# Patient Record
(demographics unavailable — no encounter records)

---

## 2024-10-22 NOTE — END OF VISIT
[FreeTextEntry3] : All medical record entries made by the Scribe were at my Dr. Milo Henderson, direction and personally dictated by me on -Oct 22, 2024, 4:20PM- I have reviewed the chart and agree that the record accurately reflects my personal performance of the history, physical exam, assessment and plan. I have also personally directed, reviewed and agreed with the chart. [Time Spent: ___ minutes] : I have spent [unfilled] minutes of time on the encounter which excludes teaching and separately reported services.

## 2024-10-22 NOTE — PHYSICAL EXAM
[Well Developed] : well developed [Well Nourished] : well nourished [No Acute Distress] : no acute distress [Normal Conjunctiva] : normal conjunctiva [Normal Venous Pressure] : normal venous pressure [Carotid Bruit] : carotid bruit [Normal S1, S2] : normal S1, S2 [No Rub] : no rub [No Gallop] : no gallop [Murmur] : murmur [Normal Rate] : normal [Irregularly Irregular] : irregularly irregular [Clear Lung Fields] : clear lung fields [Good Air Entry] : good air entry [No Respiratory Distress] : no respiratory distress  [Soft] : abdomen soft [Non Tender] : non-tender [No Masses/organomegaly] : no masses/organomegaly [Normal Bowel Sounds] : normal bowel sounds [Normal Gait] : normal gait [No Edema] : no edema [No Cyanosis] : no cyanosis [No Clubbing] : no clubbing [No Varicosities] : no varicosities [No Rash] : no rash [No Skin Lesions] : no skin lesions [Moves all extremities] : moves all extremities [No Focal Deficits] : no focal deficits [Normal Speech] : normal speech [Alert and Oriented] : alert and oriented [Normal memory] : normal memory [de-identified] : R [de-identified] :  2/6 LOLLY --> Axilla

## 2024-10-22 NOTE — PHYSICAL EXAM
[Well Developed] : well developed [Well Nourished] : well nourished [No Acute Distress] : no acute distress [Normal Conjunctiva] : normal conjunctiva [Normal Venous Pressure] : normal venous pressure [Carotid Bruit] : carotid bruit [Normal S1, S2] : normal S1, S2 [No Rub] : no rub [No Gallop] : no gallop [Murmur] : murmur [Normal Rate] : normal [Irregularly Irregular] : irregularly irregular [Clear Lung Fields] : clear lung fields [Good Air Entry] : good air entry [No Respiratory Distress] : no respiratory distress  [Soft] : abdomen soft [Non Tender] : non-tender [No Masses/organomegaly] : no masses/organomegaly [Normal Bowel Sounds] : normal bowel sounds [Normal Gait] : normal gait [No Edema] : no edema [No Cyanosis] : no cyanosis [No Clubbing] : no clubbing [No Varicosities] : no varicosities [No Rash] : no rash [No Skin Lesions] : no skin lesions [Moves all extremities] : moves all extremities [No Focal Deficits] : no focal deficits [Normal Speech] : normal speech [Alert and Oriented] : alert and oriented [Normal memory] : normal memory [de-identified] : R [de-identified] :  2/6 LOLLY --> Axilla

## 2024-10-22 NOTE — HISTORY OF PRESENT ILLNESS
[FreeTextEntry1] : Denies Chest Pain, SOB, Dizziness, Leg edema, Orthopnea, PND, Palpitations, Syncope, SCHAFER, Diaphoresis.

## 2024-10-22 NOTE — DISCUSSION/SUMMARY
[Atrial Fibrillation] : atrial fibrillation [Controlled Ventricular Response] : controlled ventricular response [Hyperlipidemia] : hyperlipidemia [Diet Modification] : diet modification [Essential Hypertension] : essential hypertension [Low Sodium Diet] : low sodium diet [NSAIDs Avoidance] : non-steroidal anti-inflammatory drugs avoidance [Mitral Regurgitation] : mitral regurgitation [Stable] : stable [None] : There are no changes in medication management [Sodium Restriction] : sodium restriction [Patient] : the patient [de-identified] : Chronic. [FreeTextEntry1] : VHD (MR) - Stable; no further intervention at this time (see last echo). Blood work reviewed with patient. Maintain a low caloric, low sodium, low cholesterol diet. Dietary counseling given, diet and exercise discussed in detail.

## 2024-10-22 NOTE — DISCUSSION/SUMMARY
[Atrial Fibrillation] : atrial fibrillation [Controlled Ventricular Response] : controlled ventricular response [Hyperlipidemia] : hyperlipidemia [Diet Modification] : diet modification [Essential Hypertension] : essential hypertension [Low Sodium Diet] : low sodium diet [NSAIDs Avoidance] : non-steroidal anti-inflammatory drugs avoidance [Mitral Regurgitation] : mitral regurgitation [Stable] : stable [None] : There are no changes in medication management [Sodium Restriction] : sodium restriction [Patient] : the patient [de-identified] : Chronic. [FreeTextEntry1] : VHD (MR) - Stable; no further intervention at this time (see last echo). Blood work reviewed with patient. Maintain a low caloric, low sodium, low cholesterol diet. Dietary counseling given, diet and exercise discussed in detail.

## 2025-01-15 NOTE — END OF VISIT
[Time Spent: ___ minutes] : I have spent [unfilled] minutes of time on the encounter which excludes teaching and separately reported services. [FreeTextEntry3] :  All medical records entered made by the scribe were at my Dr. Milo Henderson, discretion and personally dictated by me on Raheel 15 2025 10:00AM. I have reviewed the chart and agree that the record accuracy reflects my personal performance of the history, physical exam, assessment and plan. I have also personally directed, reviewed and agreed with the chart.

## 2025-01-15 NOTE — DISCUSSION/SUMMARY
[Procedure Low Risk] : the procedure risk is low [Patient Intermediate Risk] : the patient is an intermediate risk [Optimized for Surgery] : the patient is optimized for surgery [As per surgery] : as per surgery [Continue] : Continue medications as currently directed [FreeTextEntry1] :  Patient advised to discontinue Xarelto 2 days before the procedure and on the day of as per guidelines published on [employer]. Resumption of Xarelto is at discretion of the surgeon. Maintain a low caloric diet, low sodium, low cholesterol diet and exercise discussed in detail.

## 2025-01-15 NOTE — HISTORY OF PRESENT ILLNESS
[Preoperative Visit] : for a medical evaluation prior to surgery [Scheduled Procedure ___] : a [unfilled] [Date of Surgery ___] : on [unfilled] [Surgeon Name ___] : surgeon: [unfilled] [Good] : Good [Prior Anesthesia] : Prior anesthesia [Fever] : no fever [Chills] : no chills [Fatigue] : no fatigue [Chest Pain] : no chest pain [Cough] : no cough [Dyspnea] : no dyspnea [Dysuria] : no dysuria [Urinary Frequency] : no urinary frequency [Nausea] : no nausea [Vomiting] : no vomiting [Diarrhea] : no diarrhea [Abdominal Pain] : no abdominal pain [Easy Bruising] : no easy bruising [Lower Extremity Swelling] : no lower extremity swelling [Poor Exercise Tolerance] : no poor exercise tolerance [Diabetes] : no diabetes [Cardiovascular Disease] : no cardiovascular disease [Pulmonary Disease] : no pulmonary disease [Nicotine Dependence] : no nicotine dependence [Renal Disease] : no renal disease [Sleep Apnea] : no sleep apnea [Thromboembolic Problems] : no thromboembolic problems [Clotting Disorder] : no clotting disorder [Bleeding Disorder] : no bleeding disorder [Transfusion Reaction] : no transfusion reaction [Impaired Immunity] : no impaired immunity [Prev Anesthesia Reaction] : no previous anesthesia reaction [FreeTextEntry1] :  Denies Chest Pain, SOB, Dizziness, Leg Edema, Orthopnea, PND, Palpitations, Syncope, SCHAFER, Diaphoresis

## 2025-01-15 NOTE — PHYSICAL EXAM
[General Appearance - Well Developed] : well developed [Normal Appearance] : normal appearance [Well Groomed] : well groomed [General Appearance - Well Nourished] : well nourished [No Deformities] : no deformities [General Appearance - In No Acute Distress] : no acute distress [Normal Conjunctiva] : the conjunctiva exhibited no abnormalities [Eyelids - No Xanthelasma] : the eyelids demonstrated no xanthelasmas [Normal Oral Mucosa] : normal oral mucosa [No Oral Pallor] : no oral pallor [No Oral Cyanosis] : no oral cyanosis [Normal Jugular Venous A Waves Present] : normal jugular venous A waves present [Normal Jugular Venous V Waves Present] : normal jugular venous V waves present [No Jugular Venous Clark A Waves] : no jugular venous clark A waves [Respiration, Rhythm And Depth] : normal respiratory rhythm and effort [Exaggerated Use Of Accessory Muscles For Inspiration] : no accessory muscle use [Auscultation Breath Sounds / Voice Sounds] : lungs were clear to auscultation bilaterally [Abdomen Soft] : soft [Abdomen Tenderness] : non-tender [Abdomen Mass (___ Cm)] : no abdominal mass palpated [Abnormal Walk] : normal gait [Gait - Sufficient For Exercise Testing] : the gait was sufficient for exercise testing [Nail Clubbing] : no clubbing of the fingernails [Cyanosis, Localized] : no localized cyanosis [Petechial Hemorrhages (___cm)] : no petechial hemorrhages [Skin Color & Pigmentation] : normal skin color and pigmentation [] : no rash [No Venous Stasis] : no venous stasis [Skin Lesions] : no skin lesions [No Skin Ulcers] : no skin ulcer [No Xanthoma] : no  xanthoma was observed [Oriented To Time, Place, And Person] : oriented to person, place, and time [Affect] : the affect was normal [Mood] : the mood was normal [No Anxiety] : not feeling anxious [Normal] : the heart rate was normal [Irregularly Irregular] : the rhythm was irregularly irregular

## 2025-02-27 NOTE — PHYSICAL EXAM
[Well Developed] : well developed [Well Nourished] : well nourished [No Acute Distress] : no acute distress [Normal Conjunctiva] : normal conjunctiva [Normal Venous Pressure] : normal venous pressure [Carotid Bruit] : carotid bruit [Normal S1, S2] : normal S1, S2 [No Rub] : no rub [No Gallop] : no gallop [Murmur] : murmur [Normal Rate] : normal [Irregularly Irregular] : irregularly irregular [Clear Lung Fields] : clear lung fields [Good Air Entry] : good air entry [No Respiratory Distress] : no respiratory distress  [Soft] : abdomen soft [Non Tender] : non-tender [No Masses/organomegaly] : no masses/organomegaly [Normal Bowel Sounds] : normal bowel sounds [Normal Gait] : normal gait [No Edema] : no edema [No Cyanosis] : no cyanosis [No Clubbing] : no clubbing [No Varicosities] : no varicosities [No Rash] : no rash [No Skin Lesions] : no skin lesions [Moves all extremities] : moves all extremities [No Focal Deficits] : no focal deficits [Normal Speech] : normal speech [Alert and Oriented] : alert and oriented [Normal memory] : normal memory [de-identified] : R [de-identified] :  2/6 LOLLY --> Axilla

## 2025-02-27 NOTE — DISCUSSION/SUMMARY
[Atrial Fibrillation] : atrial fibrillation [Hyperlipidemia] : hyperlipidemia [Diet Modification] : diet modification [Low Sodium Diet] : low sodium diet [NSAIDs Avoidance] : non-steroidal anti-inflammatory drugs avoidance [Mitral Regurgitation] : mitral regurgitation [Stable] : stable [None] : There are no changes in medication management [Sodium Restriction] : sodium restriction [Patient] : the patient [Essential Hypertension] : essential hypertension [FreeTextEntry1] :  Blood work drawn. Maintain a low caloric, low sodium, low cholesterol diet. Dietary counseling given, diet and exercise discussed in detail. Pt. 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 Discussed at length possible Watchman procedure to lower risk of thromboembolism. Will discuss with family- Son, Christopher and if in agreement, will arrange for the procedure.

## 2025-02-27 NOTE — PHYSICAL EXAM
[Well Developed] : well developed [Well Nourished] : well nourished [No Acute Distress] : no acute distress [Normal Conjunctiva] : normal conjunctiva [Normal Venous Pressure] : normal venous pressure [Carotid Bruit] : carotid bruit [Normal S1, S2] : normal S1, S2 [No Rub] : no rub [No Gallop] : no gallop [Murmur] : murmur [Normal Rate] : normal [Irregularly Irregular] : irregularly irregular [Clear Lung Fields] : clear lung fields [Good Air Entry] : good air entry [No Respiratory Distress] : no respiratory distress  [Soft] : abdomen soft [Non Tender] : non-tender [No Masses/organomegaly] : no masses/organomegaly [Normal Bowel Sounds] : normal bowel sounds [Normal Gait] : normal gait [No Edema] : no edema [No Cyanosis] : no cyanosis [No Clubbing] : no clubbing [No Varicosities] : no varicosities [No Rash] : no rash [No Skin Lesions] : no skin lesions [Moves all extremities] : moves all extremities [No Focal Deficits] : no focal deficits [Normal Speech] : normal speech [Alert and Oriented] : alert and oriented [Normal memory] : normal memory [de-identified] : R [de-identified] :  2/6 LOLLY --> Axilla

## 2025-02-27 NOTE — END OF VISIT
[FreeTextEntry3] :  All medical records entered made by the scribe were at my Dr. Milo Henderson, discretion and personally dictated by me on Feb 27 2025  4:20PM. I have reviewed the chart and agree that the record accuracy reflects my personal performance of the history, physical exam, assessment and plan. I have also personally directed, reviewed and agreed with the chart.

## 2025-02-27 NOTE — HISTORY OF PRESENT ILLNESS
[FreeTextEntry1] :  Denies Chest Pain, SOB, Dizziness, Leg Edema, Orthopnea, PND, Palpitations, Syncope, SCHAFER, Diaphoresis

## 2025-04-15 NOTE — HISTORY OF PRESENT ILLNESS
[FreeTextEntry1] : Pt. presents for blood work for overall healthcare maintenance as prescribed by his physician. The patient denies fevers, chills, dizziness, fatigue, lightheadedness, loss of appetite. 4 = No assist / stand by assistance

## 2025-04-22 NOTE — DISCUSSION/SUMMARY
[Atrial Fibrillation] : atrial fibrillation [Hyperlipidemia] : hyperlipidemia [Diet Modification] : diet modification [Essential Hypertension] : essential hypertension [Low Sodium Diet] : low sodium diet [NSAIDs Avoidance] : non-steroidal anti-inflammatory drugs avoidance [Mitral Regurgitation] : mitral regurgitation [Stable] : stable [None] : There are no changes in medication management [Sodium Restriction] : sodium restriction [Patient] : the patient